# Patient Record
Sex: MALE | Race: BLACK OR AFRICAN AMERICAN | NOT HISPANIC OR LATINO | Employment: UNEMPLOYED | URBAN - METROPOLITAN AREA
[De-identification: names, ages, dates, MRNs, and addresses within clinical notes are randomized per-mention and may not be internally consistent; named-entity substitution may affect disease eponyms.]

---

## 2017-04-05 ENCOUNTER — HOSPITAL ENCOUNTER (EMERGENCY)
Facility: HOSPITAL | Age: 32
Discharge: LEFT BEFORE DISCHARGE | End: 2017-04-05

## 2017-04-05 VITALS
DIASTOLIC BLOOD PRESSURE: 86 MMHG | OXYGEN SATURATION: 97 % | WEIGHT: 270 LBS | BODY MASS INDEX: 44.98 KG/M2 | HEART RATE: 88 BPM | HEIGHT: 65 IN | RESPIRATION RATE: 18 BRPM | TEMPERATURE: 97.2 F | SYSTOLIC BLOOD PRESSURE: 133 MMHG

## 2017-04-05 PROCEDURE — 99211 OFF/OP EST MAY X REQ PHY/QHP: CPT

## 2017-04-05 NOTE — ED NOTES
Pt reports bilateral leg aching for 4 days. No trauma. Pt reports this is a reoccurring symptom which has been treated & no definitive diagnosis has been made.     Bria Castaneda RN  04/05/17 1520

## 2021-03-02 ENCOUNTER — OFFICE VISIT CONVERTED (OUTPATIENT)
Dept: FAMILY MEDICINE CLINIC | Age: 36
End: 2021-03-02
Attending: FAMILY MEDICINE

## 2021-05-18 NOTE — PROGRESS NOTES
Surinder Narvaez  1985     Office/Outpatient Visit    Visit Date: Tue, Mar 2, 2021 02:26 pm    Provider: Gaudencio Rocha MD (Assistant: Kareem Beal, )    Location: [facility]        Electronically signed by Gaudencio Rocha MD on  03/04/2021 12:33:42 PM                             Subjective:        CC: Mr. Narvaez is a 36 year old male.  This is his first visit to the clinic.  establish care, cellulitis, has epilepsy         HPI:           PHQ-9 Depression Screening: Completed form scanned and in chart; Total Score 13           Dx with epilepsy, unspecified, not intractable, without status epilepticus; the precipitating event for the patient's seizure appears to be None.  Witnessed seizure activity is described as A MIXTURE OF SEIZURE TYPES.  Typical seizures are described as FOLLOWED BY NEUROLOGY, NEG W/U THUS FAR.  During the course of treatment there has been minimal improvement in seizures.  Compliance with treatment has good.            Concerning dysthymic disorder, the diagnosis of depression was made several months ago.  Presently, he feels a moderate degree of depression.  Currently not on any antidepressants.  Current affective symptoms include anhedonia, crying spells, decreased ability to concentrate, sadness and feelings of worthlessness.  Presently, Mr. Narvaez denies suicidal ideation.  Recent stressors include POOR HEALTH, UNABLE TO WORK, DISABILITY PENDING.  IS INTERESTED IN TRYING MEDS     ROS:     CONSTITUTIONAL:  Positive for fatigue.   Negative for chills or fever.      E/N/T:  Negative for ear pain, nasal congestion and sore throat.      CARDIOVASCULAR:  Negative for chest pain and palpitations.      RESPIRATORY:  Negative for recent cough and dyspnea.      GASTROINTESTINAL:  Negative for abdominal pain, nausea and vomiting.      MUSCULOSKELETAL:  Negative for myalgias.      INTEGUMENTARY:  Positive for SWILLEN AREA ON RIGHT FOREARM.      NEUROLOGICAL:  Negative for  headaches.      HEMATOLOGIC/LYMPHATIC:  Positive for PRIOR DVT/P.E., NO HEMATOLOGY W/U.          Past Medical History / Family History / Social History:         Last Reviewed on 3/04/2021 11:57 AM by Gaudencio Rocha    Past Medical History:             PAST MEDICAL HISTORY         DVT/P.E.          CURRENT MEDICAL PROVIDERS:    Neurologist         PREVENTIVE HEALTH MAINTENANCE             DENTAL CLEANING: RECOMMENDED 3/2/21     EYE EXAM: RECOMMENDED 3/2/21         Surgical History:         REPAIR OF INJURED RIGHT EAR;         Family History:         Positive for Hypertension.      Positive for Type 2 Diabetes.          Social History:     Occupation: Unemployed. WAS A -DISABILITY PENDING;         Tobacco/Alcohol/Supplements:     Last Reviewed on 3/04/2021 11:57 AM by Gaudencoi Rocha    Tobacco: Current Smoker: He currently smokes every day, 1/2 pack per day.  Non-drinker     Caffeine:  He admits to consuming caffeine via -LITTLE.          Substance Abuse History:     Last Reviewed on 3/04/2021 11:57 AM by Gaudencio Rocha        Marijuana: Current use.          Current Problems:     None Recorded        Immunizations:     None        Allergies:     Last Reviewed on 3/04/2021 11:57 AM by Gaudencio Rocha    BenadryL:          Current Medications:     Last Reviewed on 3/04/2021 11:57 AM by Gaudencio Rocha    OXCARBAZEPINE 600MG TABLETS [TAKE 1 AND 1/2 TABLETS BY MOUTH TWICE DAILY]        Objective:        Vitals:         Current: 3/2/2021 2:34:12 PM    Ht:  5 ft, 7.75 in;  Wt: 276.2 lbs;  BMI: 42.3T: 97.6 F (temporal);  BP: 139/87 mm Hg (left arm, sitting);  P: 80 bpm (left arm (BP Cuff), sitting)        Exams:     PHYSICAL EXAM:     GENERAL: Vitals recorded well developed, well nourished;  well groomed;  no apparent distress;     NECK: trachea is midline; thyroid is non-palpable;     RESPIRATORY: normal respiratory rate and pattern with no distress; normal breath sounds with  no rales, rhonchi, wheezes or rubs;     CARDIOVASCULAR: normal rate; rhythm is regular;  no systolic murmur; no edema;     GASTROINTESTINAL: nontender;     LYMPHATIC: no enlargement of cervical or facial nodes; no supraclavicular nodes;     SKIN: 5 X 5 CM AREA OF INDURATION ON RIGHT FOREARM, N-V EXAM NORMJAL;     NEUROLOGIC: GROSSLY INTACT     PSYCHIATRIC:  appropriate affect and demeanor; normal speech pattern; grossly normal memory;         Assessment:         G40.909   Epilepsy, unspecified, not intractable, without status epilepticus       F34.1   Dysthymic disorder       Z86.711   Personal history of pulmonary embolism       R23.4   Changes in skin texture (R forearm)       Z13.31   Encounter for screening for depression           Plan:         Epilepsy, unspecified, not intractable, without status epilepticus        MEDICATIONS: (no change to current medication regimen)     RECOMMENDATIONS given include: NOT UNDER GOOD CONTRTOL AT THIS TIME.  REC TO F/U WITH NEUROLOGY AS PLANNED.  WE WILL TRY TO ARRANGE THIS FOR HIM.      FOLLOW-UP: LABS AT NEXT VISIT         Dysthymic disorder        WE WILL CHECK WITH HIS NEUROLOGIST RE ANTIDEPRESSWANT CHOICE         Personal history of pulmonary embolism        CONSIDER HEMEATOLOGY REFERRAL         Changes in skin texture (R forearm)    Observe for now Consider further workup             Patient Recommendations:        For  Personal history of pulmonary embolism:    I also recommend CONSIDER HEMEATOLOGY REFERRAL.              Charge Capture:         Primary Diagnosis:     G40.909  Epilepsy, unspecified, not intractable, without status epilepticus           Orders:      [id number]  Office visit - new pt, level 3  (In-House)              F34.1  Dysthymic disorder     Z86.711  Personal history of pulmonary embolism     R23.4  Changes in skin texture (R forearm)     Z13.31  Encounter for screening for depression

## 2021-07-02 VITALS
HEART RATE: 80 BPM | WEIGHT: 276.2 LBS | SYSTOLIC BLOOD PRESSURE: 139 MMHG | DIASTOLIC BLOOD PRESSURE: 87 MMHG | HEIGHT: 68 IN | BODY MASS INDEX: 41.86 KG/M2 | TEMPERATURE: 97.6 F

## 2022-10-05 ENCOUNTER — TRANSCRIBE ORDERS (OUTPATIENT)
Dept: ADMINISTRATIVE | Facility: HOSPITAL | Age: 37
End: 2022-10-05

## 2022-10-05 DIAGNOSIS — M25.472 PAIN AND SWELLING OF ANKLE, LEFT: Primary | ICD-10-CM

## 2022-10-05 DIAGNOSIS — M25.572 PAIN AND SWELLING OF ANKLE, LEFT: Primary | ICD-10-CM

## 2022-10-05 DIAGNOSIS — R70.0 ELEVATED SEDIMENTATION RATE: ICD-10-CM

## 2022-10-05 DIAGNOSIS — M79.672 PAIN IN LEFT FOOT: ICD-10-CM

## 2022-10-05 DIAGNOSIS — R74.8 ALKALINE PHOSPHATASE ELEVATION: ICD-10-CM

## 2022-10-05 DIAGNOSIS — R76.8 ANA POSITIVE: ICD-10-CM

## 2022-10-11 ENCOUNTER — APPOINTMENT (OUTPATIENT)
Dept: NUCLEAR MEDICINE | Facility: HOSPITAL | Age: 37
End: 2022-10-11

## 2022-10-12 ENCOUNTER — HOSPITAL ENCOUNTER (OUTPATIENT)
Dept: NUCLEAR MEDICINE | Facility: HOSPITAL | Age: 37
End: 2022-10-12

## 2022-10-12 ENCOUNTER — HOSPITAL ENCOUNTER (OUTPATIENT)
Dept: NUCLEAR MEDICINE | Facility: HOSPITAL | Age: 37
Discharge: HOME | End: 2022-10-12

## 2022-10-12 DIAGNOSIS — M79.672 PAIN IN LEFT FOOT: ICD-10-CM

## 2022-10-12 DIAGNOSIS — M25.472 PAIN AND SWELLING OF ANKLE, LEFT: ICD-10-CM

## 2022-10-12 DIAGNOSIS — R76.8 ANA POSITIVE: ICD-10-CM

## 2022-10-12 DIAGNOSIS — M25.572 PAIN AND SWELLING OF ANKLE, LEFT: ICD-10-CM

## 2022-10-12 DIAGNOSIS — R74.8 ALKALINE PHOSPHATASE ELEVATION: ICD-10-CM

## 2022-10-12 DIAGNOSIS — R70.0 ELEVATED SEDIMENTATION RATE: ICD-10-CM

## 2022-10-21 ENCOUNTER — APPOINTMENT (OUTPATIENT)
Dept: MRI IMAGING | Facility: HOSPITAL | Age: 37
End: 2022-10-21

## 2022-10-25 ENCOUNTER — HOSPITAL ENCOUNTER (OUTPATIENT)
Dept: MRI IMAGING | Facility: HOSPITAL | Age: 37
End: 2022-10-25

## 2022-10-25 ENCOUNTER — HOSPITAL ENCOUNTER (OUTPATIENT)
Dept: MRI IMAGING | Facility: HOSPITAL | Age: 37
Discharge: HOME | End: 2022-10-25
Admitting: INTERNAL MEDICINE

## 2022-10-25 DIAGNOSIS — M25.572 PAIN AND SWELLING OF ANKLE, LEFT: ICD-10-CM

## 2022-10-25 DIAGNOSIS — M25.472 PAIN AND SWELLING OF ANKLE, LEFT: ICD-10-CM

## 2022-10-25 DIAGNOSIS — M79.672 PAIN IN LEFT FOOT: ICD-10-CM

## 2022-10-25 DIAGNOSIS — R76.8 ANA POSITIVE: ICD-10-CM

## 2022-10-25 DIAGNOSIS — R70.0 ELEVATED SEDIMENTATION RATE: ICD-10-CM

## 2022-10-25 DIAGNOSIS — R74.8 ALKALINE PHOSPHATASE ELEVATION: ICD-10-CM

## 2022-10-25 PROCEDURE — 73721 MRI JNT OF LWR EXTRE W/O DYE: CPT
